# Patient Record
Sex: FEMALE | Race: WHITE | NOT HISPANIC OR LATINO | URBAN - METROPOLITAN AREA
[De-identification: names, ages, dates, MRNs, and addresses within clinical notes are randomized per-mention and may not be internally consistent; named-entity substitution may affect disease eponyms.]

---

## 2017-10-12 ENCOUNTER — OFFICE (OUTPATIENT)
Dept: URBAN - METROPOLITAN AREA CLINIC 45 | Facility: CLINIC | Age: 67
End: 2017-10-12

## 2017-10-12 VITALS
DIASTOLIC BLOOD PRESSURE: 75 MMHG | HEART RATE: 89 BPM | HEIGHT: 66 IN | SYSTOLIC BLOOD PRESSURE: 145 MMHG | WEIGHT: 146 LBS

## 2017-10-12 DIAGNOSIS — Z86.010 PERSONAL HISTORY COLON POLYPS: ICD-10-CM

## 2017-10-12 DIAGNOSIS — K21.9 GERD: ICD-10-CM

## 2017-10-12 PROCEDURE — 99204 OFFICE O/P NEW MOD 45 MIN: CPT

## 2017-10-12 NOTE — SERVICEHPINOTES
Patient sent for pre-procedure medical clearance prior to considering a surveillance colonoscopy.  The last colonoscopy was performed in 6/17/14The patient is seen for the evaluation of suspected GERD.    Noted the onset of   heartburn  many  years   ago  .   Symptoms have been occurring   1 - 3   time(s) per   week  .    During a given day, they are most prevalent   shortly after eating meals  .    They are exacerbated by   eating meals late at night  .   In the past, the patient has tried   Pantoprazole  .   Currently takes   Pantoprazole   dosed   every day  .   On this therapy, symptom response has been   excellent  .    Associated symptoms include   dyspepsia  .      Has also noted atypical (non-esophageal) symptoms including   .    A prior EGD has been performed and showed   reflux esophagitis  .   ___. The patient has no family history of colon cancer or other GI diseases.  Patient denies abdominal pain, change in bowel habits, constipation, diarrhea, rectal bleeding, nausea, vomiting, and weight loss.

## 2017-12-18 ENCOUNTER — AMBULATORY SURGICAL CENTER (OUTPATIENT)
Dept: URBAN - METROPOLITAN AREA SURGERY 39 | Facility: SURGERY | Age: 67
End: 2017-12-18

## 2017-12-18 VITALS
WEIGHT: 148 LBS | OXYGEN SATURATION: 100 % | TEMPERATURE: 95.7 F | SYSTOLIC BLOOD PRESSURE: 185 MMHG | RESPIRATION RATE: 18 BRPM | HEIGHT: 66 IN | DIASTOLIC BLOOD PRESSURE: 71 MMHG | HEART RATE: 83 BPM

## 2017-12-18 DIAGNOSIS — Z12.11 ENCOUNTER FOR SCREENING FOR MALIGNANT NEOPLASM OF COLON: ICD-10-CM

## 2017-12-18 DIAGNOSIS — K31.7 POLYP OF STOMACH AND DUODENUM: ICD-10-CM

## 2017-12-18 DIAGNOSIS — K44.9 DIAPHRAGMATIC HERNIA WITHOUT OBSTRUCTION OR GANGRENE: ICD-10-CM

## 2017-12-18 DIAGNOSIS — K20.9 ESOPHAGITIS, UNSPECIFIED: ICD-10-CM

## 2017-12-18 PROBLEM — K57.30 DVRTCLOS OF LG INT W/O PERFORATION OR ABSCESS W/O BLEEDING: Status: ACTIVE | Noted: 2017-12-18

## 2017-12-18 PROBLEM — K64.0 FIRST DEGREE HEMORRHOIDS: Status: ACTIVE | Noted: 2017-12-18

## 2017-12-18 LAB — SURGICAL: PDF REPORT: (no result)

## 2017-12-18 PROCEDURE — 45378 DIAGNOSTIC COLONOSCOPY: CPT

## 2017-12-18 PROCEDURE — 43239 EGD BIOPSY SINGLE/MULTIPLE: CPT

## 2017-12-18 NOTE — SERVICEHPINOTES
Patient sent for pre-procedure medical clearance prior to considering a surveillance colonoscopy. The last colonoscopy was performed in 6/17/14The patient is seen for the evaluation of suspected GERD. Noted the onset of heartburn many years ago. Symptoms have been occurring 1 - 3 time(s) per week. During a given day, they are most prevalent shortly after eating meals. They are exacerbated by eating meals late at night. In the past, the patient has tried Pantoprazole. Currently takes Pantoprazole dosed every day. On this therapy, symptom response has been excellent. Associated symptoms include dyspepsia. A prior EGD has been performed and showed reflux esophagitis. ___. The patient has no family history of colon cancer or other GI diseases. Patient denies abdominal pain, change in bowel habits, constipation, diarrhea, rectal bleeding, nausea, vomiting, and weight loss.

## 2019-09-17 ENCOUNTER — HOSPITAL ENCOUNTER (OUTPATIENT)
Dept: HOSPITAL 91 - RAD | Age: 69
Discharge: HOME | End: 2019-09-17
Payer: MEDICARE

## 2019-09-17 ENCOUNTER — HOSPITAL ENCOUNTER (OUTPATIENT)
Dept: HOSPITAL 10 - RAD | Age: 69
Discharge: HOME | End: 2019-09-17
Attending: INTERNAL MEDICINE
Payer: MEDICARE

## 2019-09-17 DIAGNOSIS — R07.89: Primary | ICD-10-CM

## 2019-09-17 DIAGNOSIS — J20.9: ICD-10-CM

## 2019-09-17 PROCEDURE — 71046 X-RAY EXAM CHEST 2 VIEWS: CPT

## 2019-09-17 PROCEDURE — 93005 ELECTROCARDIOGRAM TRACING: CPT
